# Patient Record
Sex: FEMALE | Race: WHITE | Employment: FULL TIME | URBAN - METROPOLITAN AREA
[De-identification: names, ages, dates, MRNs, and addresses within clinical notes are randomized per-mention and may not be internally consistent; named-entity substitution may affect disease eponyms.]

---

## 2017-02-13 ENCOUNTER — TELEPHONE (OUTPATIENT)
Dept: OBGYN CLINIC | Facility: CLINIC | Age: 42
End: 2017-02-13

## 2017-02-13 NOTE — TELEPHONE ENCOUNTER
Pt called stating her periods have become very irregular the last 3 months  Now they come every 22 or 23 days  Pt is questioning whether or not this is menapause

## 2017-02-13 NOTE — TELEPHONE ENCOUNTER
Pt c/o irregular menses, LMP 1/22/17 PLMP: 12/28 and 11/30. Menses x 3 days, changing tampon every 3-4 hours. Pt c/o more frequent night sweats and hot flashes. Pt s/p ablation 2010. Pt's mother went through menopause mid 42's.    Pt asking if any margo

## 2017-04-03 ENCOUNTER — OFFICE VISIT (OUTPATIENT)
Dept: OBGYN CLINIC | Facility: CLINIC | Age: 42
End: 2017-04-03

## 2017-04-03 VITALS
HEART RATE: 88 BPM | BODY MASS INDEX: 28.93 KG/M2 | DIASTOLIC BLOOD PRESSURE: 70 MMHG | HEIGHT: 66 IN | WEIGHT: 180 LBS | SYSTOLIC BLOOD PRESSURE: 128 MMHG

## 2017-04-03 DIAGNOSIS — R92.8 FOLLOW-UP EXAMINATION OF ABNORMAL MAMMOGRAM: ICD-10-CM

## 2017-04-03 DIAGNOSIS — Z12.4 ROUTINE PAPANICOLAOU SMEAR: ICD-10-CM

## 2017-04-03 DIAGNOSIS — Z01.419 ENCOUNTER FOR GYNECOLOGICAL EXAMINATION WITHOUT ABNORMAL FINDING: Primary | ICD-10-CM

## 2017-04-03 DIAGNOSIS — N92.6 IRREGULAR MENSES: ICD-10-CM

## 2017-04-03 PROCEDURE — 87624 HPV HI-RISK TYP POOLED RSLT: CPT | Performed by: OBSTETRICS & GYNECOLOGY

## 2017-04-03 PROCEDURE — 99396 PREV VISIT EST AGE 40-64: CPT | Performed by: OBSTETRICS & GYNECOLOGY

## 2017-04-03 NOTE — PROGRESS NOTES
Patient ID:   Yulisa Hutchins  is a 35year old female         HPI:     Here for general gyn exam. Last seen 2016. New medical/surgical hx:  None. No LMP recorded. Menses:   Hx Prior Abnormal Pap: No  Pap Date: 14  Pap Result Note Chronic rhinitis          Past Surgical History    TONSILLECTOMY      Comment AGE 15    OTHER SURGICAL HISTORY      Comment LEFT ELBOW - FX REPAIR    OTHER SURGICAL HISTORY      Comment LEFT SURGERY - ARTHROSCOPIC X2    OTHER SURGICAL HISTORY      Comment mammogram      Orders Placed This Encounter  ThinPrep PAP with HPV Reflex Request    Meds & Refills for this Visit:  No prescriptions requested or ordered in this encounter    Imaging & Consults:  [facility] FAITH 2D+3D DIAGNOSTIC GO CHARLES (CPT=77066/84315)    N

## 2017-04-25 ENCOUNTER — APPOINTMENT (OUTPATIENT)
Dept: LAB | Age: 42
End: 2017-04-25
Attending: NURSE PRACTITIONER
Payer: COMMERCIAL

## 2017-04-25 ENCOUNTER — OFFICE VISIT (OUTPATIENT)
Dept: OBGYN CLINIC | Facility: CLINIC | Age: 42
End: 2017-04-25

## 2017-04-25 VITALS
WEIGHT: 177 LBS | HEART RATE: 96 BPM | SYSTOLIC BLOOD PRESSURE: 132 MMHG | DIASTOLIC BLOOD PRESSURE: 76 MMHG | HEIGHT: 66 IN | BODY MASS INDEX: 28.45 KG/M2

## 2017-04-25 DIAGNOSIS — Z11.3 SCREEN FOR STD (SEXUALLY TRANSMITTED DISEASE): ICD-10-CM

## 2017-04-25 DIAGNOSIS — R35.0 URINARY FREQUENCY: ICD-10-CM

## 2017-04-25 DIAGNOSIS — Z11.3 SCREEN FOR STD (SEXUALLY TRANSMITTED DISEASE): Primary | ICD-10-CM

## 2017-04-25 PROCEDURE — 80074 ACUTE HEPATITIS PANEL: CPT | Performed by: NURSE PRACTITIONER

## 2017-04-25 PROCEDURE — 87389 HIV-1 AG W/HIV-1&-2 AB AG IA: CPT | Performed by: NURSE PRACTITIONER

## 2017-04-25 PROCEDURE — 86780 TREPONEMA PALLIDUM: CPT | Performed by: NURSE PRACTITIONER

## 2017-04-25 PROCEDURE — 99213 OFFICE O/P EST LOW 20 MIN: CPT | Performed by: NURSE PRACTITIONER

## 2017-04-25 PROCEDURE — 36415 COLL VENOUS BLD VENIPUNCTURE: CPT | Performed by: NURSE PRACTITIONER

## 2017-04-25 RX ORDER — NITROFURANTOIN 25; 75 MG/1; MG/1
100 CAPSULE ORAL 2 TIMES DAILY
Qty: 14 CAPSULE | Refills: 0 | Status: SHIPPED | OUTPATIENT
Start: 2017-04-25 | End: 2017-05-02

## 2017-04-25 RX ORDER — GARLIC EXTRACT 500 MG
1 CAPSULE ORAL DAILY
COMMUNITY
End: 2019-07-25

## 2017-04-25 NOTE — PROGRESS NOTES
S:  Patient here desiring STD screening. Patient desires any and all tests. She also notes recent urinary frequency.     O:  Vagina pink, moist without lesions  Cervix without lesions or CMT  Normal, clear discharge in vault  UA with blood otherwise unremar

## 2017-04-26 NOTE — PROGRESS NOTES
Quick Note:    Patient notified of normal labs.  Will call with results of cultures once they are complete.  ______

## 2017-04-28 NOTE — PROGRESS NOTES
Quick Note:    Patient notified of negative urine and GC/ CHL cultures. Patient still notes some urinary frequency. Advised increase water, less caffeine and acidic foods. Call with any worsening or persistent symptoms.   ______

## 2017-06-22 ENCOUNTER — HOSPITAL ENCOUNTER (OUTPATIENT)
Dept: MAMMOGRAPHY | Facility: HOSPITAL | Age: 42
Discharge: HOME | End: 2017-06-22
Attending: OBSTETRICS & GYNECOLOGY
Payer: COMMERCIAL

## 2017-06-22 ENCOUNTER — TELEPHONE (OUTPATIENT)
Dept: OBGYN CLINIC | Facility: CLINIC | Age: 42
End: 2017-06-22

## 2017-06-22 DIAGNOSIS — R92.8 FOLLOW-UP EXAMINATION OF ABNORMAL MAMMOGRAM: ICD-10-CM

## 2017-06-22 DIAGNOSIS — R92.8 BREAST LESION ON MAMMOGRAPHY: Primary | ICD-10-CM

## 2017-06-22 PROCEDURE — 77062 BREAST TOMOSYNTHESIS BI: CPT | Performed by: OBSTETRICS & GYNECOLOGY

## 2017-06-22 PROCEDURE — 77066 DX MAMMO INCL CAD BI: CPT | Performed by: OBSTETRICS & GYNECOLOGY

## 2017-06-22 PROCEDURE — 76642 ULTRASOUND BREAST LIMITED: CPT | Performed by: OBSTETRICS & GYNECOLOGY

## 2017-06-22 NOTE — TELEPHONE ENCOUNTER
Leobardo Reyna from Radiology dept.  Wants you to review the results of patient breast us and mammogram.  Results in Epic

## 2017-06-22 NOTE — PROGRESS NOTES
Quick Note:    New complex nodule in R breast. Ultrasound guided biopsy recommended.  Will generate request.  ______

## 2017-06-29 ENCOUNTER — HOSPITAL ENCOUNTER (OUTPATIENT)
Dept: MAMMOGRAPHY | Facility: HOSPITAL | Age: 42
Discharge: HOME | End: 2017-06-29
Attending: OBSTETRICS & GYNECOLOGY
Payer: COMMERCIAL

## 2017-06-29 DIAGNOSIS — R92.8 BREAST LESION ON MAMMOGRAPHY: ICD-10-CM

## 2017-06-29 PROCEDURE — 88305 TISSUE EXAM BY PATHOLOGIST: CPT | Performed by: OBSTETRICS & GYNECOLOGY

## 2017-06-29 PROCEDURE — 19083 BX BREAST 1ST LESION US IMAG: CPT | Performed by: OBSTETRICS & GYNECOLOGY

## 2017-06-29 PROCEDURE — 77065 DX MAMMO INCL CAD UNI: CPT | Performed by: OBSTETRICS & GYNECOLOGY

## 2017-06-29 NOTE — PROCEDURES
Caution: Report not yet finalized and possibly incomplete! PROCEDURE: ULTRASOUND GUIDED BIOPSY OF THE RIGHT BREAST, ONE SITE    COMPARISON: US ORCK BREAST RIGHT LIMITED ([facility] SAME DAY US)(CPT=76642), 10/26/2016, 8:54.   Rich Walker , [facility] FAITH 2D+3D DIAG MA breast.  2. Post procedure mammogram showed the clip to be deployed appropriately.

## 2017-07-01 NOTE — PROGRESS NOTES
Benign fibrocystic changes on biopsy. Voice mail to pt. Will need repeat right sided mammogram for follow up in 6 months.

## 2018-02-26 ENCOUNTER — OFFICE VISIT (OUTPATIENT)
Dept: OBGYN CLINIC | Facility: CLINIC | Age: 43
End: 2018-02-26

## 2018-02-26 VITALS
DIASTOLIC BLOOD PRESSURE: 70 MMHG | WEIGHT: 171 LBS | BODY MASS INDEX: 27.16 KG/M2 | SYSTOLIC BLOOD PRESSURE: 128 MMHG | HEIGHT: 66.5 IN

## 2018-02-26 DIAGNOSIS — N95.1 PERIMENOPAUSAL VASOMOTOR SYMPTOMS: Primary | ICD-10-CM

## 2018-02-26 DIAGNOSIS — Z98.890 H/O RIGHT BREAST BIOPSY: ICD-10-CM

## 2018-02-26 DIAGNOSIS — Z87.898 HISTORY OF ABNORMAL MAMMOGRAM: ICD-10-CM

## 2018-02-26 PROCEDURE — 99213 OFFICE O/P EST LOW 20 MIN: CPT | Performed by: OBSTETRICS & GYNECOLOGY

## 2018-02-26 RX ORDER — CHLORAL HYDRATE 500 MG
1000 CAPSULE ORAL DAILY
COMMUNITY
End: 2019-07-15

## 2018-02-26 NOTE — PROGRESS NOTES
Patient is a 38year old here for amado menopausal vasomotor symptoms. Last seen in April 2017 for annual exam. Menses still regular, Q 28 to 30 days x 2 to 3 light days since ablation.  Is getting flushing and diaphoresis with menses and for one week after

## 2018-08-15 ENCOUNTER — HOSPITAL ENCOUNTER (OUTPATIENT)
Dept: MAMMOGRAPHY | Facility: HOSPITAL | Age: 43
Discharge: HOME | End: 2018-08-15
Attending: OBSTETRICS & GYNECOLOGY
Payer: COMMERCIAL

## 2018-08-15 DIAGNOSIS — Z01.411 ENCOUNTER FOR GYNECOLOGICAL EXAMINATION WITH ABNORMAL FINDING: ICD-10-CM

## 2018-08-15 PROCEDURE — 76642 ULTRASOUND BREAST LIMITED: CPT | Performed by: OBSTETRICS & GYNECOLOGY

## 2018-08-15 PROCEDURE — 77062 BREAST TOMOSYNTHESIS BI: CPT | Performed by: OBSTETRICS & GYNECOLOGY

## 2018-08-15 PROCEDURE — 77066 DX MAMMO INCL CAD BI: CPT | Performed by: OBSTETRICS & GYNECOLOGY

## 2019-07-25 PROBLEM — M54.2 NECK PAIN, BILATERAL: Status: ACTIVE | Noted: 2019-07-25

## 2019-08-06 ENCOUNTER — OFFICE VISIT (OUTPATIENT)
Dept: UROLOGY | Facility: HOSPITAL | Age: 44
End: 2019-08-06
Attending: OBSTETRICS & GYNECOLOGY
Payer: COMMERCIAL

## 2019-08-06 VITALS
WEIGHT: 180 LBS | HEIGHT: 66 IN | DIASTOLIC BLOOD PRESSURE: 68 MMHG | BODY MASS INDEX: 28.93 KG/M2 | SYSTOLIC BLOOD PRESSURE: 128 MMHG | RESPIRATION RATE: 20 BRPM

## 2019-08-06 DIAGNOSIS — R31.9 HEMATURIA: ICD-10-CM

## 2019-08-06 DIAGNOSIS — D21.9 FIBROIDS: ICD-10-CM

## 2019-08-06 DIAGNOSIS — N39.3 FEMALE STRESS INCONTINENCE: Primary | ICD-10-CM

## 2019-08-06 DIAGNOSIS — N81.84 PELVIC MUSCLE WASTING: ICD-10-CM

## 2019-08-06 LAB
BILIRUB UR QL STRIP.AUTO: NEGATIVE
CLARITY UR REFRACT.AUTO: CLEAR
CONTROL RUN WITHIN 24 HOURS?: YES
GLUCOSE UR STRIP.AUTO-MCNC: NEGATIVE MG/DL
KETONES UR STRIP.AUTO-MCNC: NEGATIVE MG/DL
LEUKOCYTE ESTERASE UR QL STRIP.AUTO: NEGATIVE
LEUKOCYTE ESTERASE URINE: NEGATIVE
NITRITE UR QL STRIP.AUTO: NEGATIVE
NITRITE URINE: NEGATIVE
PH UR STRIP.AUTO: 7 [PH] (ref 4.5–8)
PROT UR STRIP.AUTO-MCNC: NEGATIVE MG/DL
SP GR UR STRIP.AUTO: 1.01 (ref 1–1.03)
UROBILINOGEN UR STRIP.AUTO-MCNC: <2 MG/DL

## 2019-08-06 PROCEDURE — 87086 URINE CULTURE/COLONY COUNT: CPT | Performed by: OBSTETRICS & GYNECOLOGY

## 2019-08-06 PROCEDURE — 99201 HC OUTPT EVAL AND MGNT NEW PT LEVEL 1: CPT

## 2019-08-06 PROCEDURE — 81001 URINALYSIS AUTO W/SCOPE: CPT | Performed by: OBSTETRICS & GYNECOLOGY

## 2019-08-06 NOTE — PROGRESS NOTES
Ramesh Snyder,   8/6/2019     Referred by Dr. Emanuel Escalante  Pt here with self    Patient presents with: Incontinence: intial visit. reffered by dr. Vadim Roach. pt. with 14cm uterus with multiple fibriods.      Planning surgery    HPI:  + KISHA  Denies UUI  Nocturi tobacco: Never Used    Alcohol use:  Yes      Alcohol/week: 8.0 standard drinks      Types: 8 Standard drinks or equivalent per week      Comment: OCCASIONALLY    Drug use: No       Allergies:    Bactrim                 RASH, SWELLING    Comment:THROAT SWEL effort  HEART:  RRR  ABDOMEN: soft, no mass, no hernia  EXTREM:  Normal, no edema  SKIN:  Normal, no lesions    PELVIC EXAM:  Ext.  Gen: no atrophy, no lesions  Urethra: no atrophy, nontender  Bladder:+fullness, nontender  Vagina: no atrophy  Cervix: no ble supplement  Stimulant:  MOM, Miralax    Treatment Plan, Surgical:   Hysterectomy-Vag, Abd, LAVH, Laporascopic Sacrocolpoxy Hysterectomy, Total Laporoscopic Hysterectomy, Robotic  Risks/benefits of salpingooophorectomy  Uterosacral suspension  Mid-urethral

## 2019-08-07 ENCOUNTER — OFFICE VISIT (OUTPATIENT)
Dept: UROLOGY | Facility: HOSPITAL | Age: 44
End: 2019-08-07
Attending: OBSTETRICS & GYNECOLOGY
Payer: COMMERCIAL

## 2019-08-07 VITALS
SYSTOLIC BLOOD PRESSURE: 122 MMHG | WEIGHT: 180 LBS | HEIGHT: 66 IN | BODY MASS INDEX: 28.93 KG/M2 | DIASTOLIC BLOOD PRESSURE: 78 MMHG

## 2019-08-07 DIAGNOSIS — N39.3 FEMALE STRESS INCONTINENCE: Primary | ICD-10-CM

## 2019-08-07 LAB
BLOOD URINE: NEGATIVE
CONTROL RUN WITHIN 24 HOURS?: YES
LEUKOCYTE ESTERASE URINE: NEGATIVE
NITRITE URINE: NEGATIVE

## 2019-08-07 PROCEDURE — 51784 ANAL/URINARY MUSCLE STUDY: CPT

## 2019-08-07 PROCEDURE — 51741 ELECTRO-UROFLOWMETRY FIRST: CPT

## 2019-08-07 PROCEDURE — 51797 INTRAABDOMINAL PRESSURE TEST: CPT

## 2019-08-07 PROCEDURE — 51729 CYSTOMETROGRAM W/VP&UP: CPT

## 2019-08-07 NOTE — PATIENT INSTRUCTIONS
[facility]  [address] [place] [address],[address]  [place], [address] [facility]  Office: [phone]      Urodynamic Testing Discharge Instructions: There are NO dietary or activity restrictions. You may resume your normal schedule.       You may hav

## 2019-08-07 NOTE — PROCEDURES
Patient here for urodynamic testing. Procedure explained and confirmed by patient. See evaluation form for results. Both verbal and written discharge instructions were given.   Patient tolerated procedure well and will follow up with Dr. Anne-Marie Umana sensation:   11 mL  First desire to void:   122 mL  Strong desire to void:  223 mL  Maximum cystometric capacity:   340 mL  Detrusor Activity:  []  Unstable   [x]  Stable  Urge leakage?     []  Yes [x]  No  Volume at 1st unhibited detrusor cont:   n/a mL

## 2019-08-09 ENCOUNTER — TELEPHONE (OUTPATIENT)
Dept: UROLOGY | Facility: HOSPITAL | Age: 44
End: 2019-08-09

## 2019-08-09 NOTE — TELEPHONE ENCOUNTER
Called patient to let her know urine culture results were negative,no infection. Instructed her to call with any questions or concerns. Left message.

## 2019-08-13 ENCOUNTER — OFFICE VISIT (OUTPATIENT)
Dept: UROLOGY | Facility: HOSPITAL | Age: 44
End: 2019-08-13
Attending: OBSTETRICS & GYNECOLOGY
Payer: COMMERCIAL

## 2019-08-13 VITALS
WEIGHT: 180 LBS | HEIGHT: 66 IN | SYSTOLIC BLOOD PRESSURE: 120 MMHG | BODY MASS INDEX: 28.93 KG/M2 | DIASTOLIC BLOOD PRESSURE: 60 MMHG

## 2019-08-13 DIAGNOSIS — R39.9 UTI SYMPTOMS: ICD-10-CM

## 2019-08-13 DIAGNOSIS — N39.3 FEMALE STRESS INCONTINENCE: Primary | ICD-10-CM

## 2019-08-13 LAB
CONTROL RUN WITHIN 24 HOURS?: YES
NITRITE URINE: NEGATIVE

## 2019-08-13 PROCEDURE — 87086 URINE CULTURE/COLONY COUNT: CPT | Performed by: OBSTETRICS & GYNECOLOGY

## 2019-08-13 PROCEDURE — 99211 OFF/OP EST MAY X REQ PHY/QHP: CPT

## 2019-08-13 RX ORDER — NITROFURANTOIN 25; 75 MG/1; MG/1
100 CAPSULE ORAL 2 TIMES DAILY
Qty: 14 CAPSULE | Refills: 0 | Status: SHIPPED | OUTPATIENT
Start: 2019-08-13 | End: 2019-08-20

## 2019-08-13 NOTE — PATIENT INSTRUCTIONS
[facility]     Post-Operative Guidelines      · AVOID CONSTIPATION - Take Miralax: one capful in water or juice each morning.  You can also take each evening if needed.  Use milk of magnesia daily as needed to avoid straining

## 2019-08-13 NOTE — PROGRESS NOTES
Pt presents w/ initial c/o KISHA  Urodynamic testing undergone without complication.   Results reviewed with patient  379/40cc & 510/20cc  No [facility] >340cc  KISHA @ 100cc    Discussed with patient mgmt options for KISHA    Thorough discussion of surgical risks,

## 2019-08-21 NOTE — PAT NURSING NOTE
Faxed alert that Heparin can cause similar reaction to Bactrim (rash, swelling) to Dr Frederick Rosario office Call to office as well.

## 2019-08-26 PROCEDURE — 88305 TISSUE EXAM BY PATHOLOGIST: CPT | Performed by: OBSTETRICS & GYNECOLOGY

## 2019-08-28 ENCOUNTER — LAB ENCOUNTER (OUTPATIENT)
Dept: LAB | Age: 44
End: 2019-08-28
Payer: COMMERCIAL

## 2019-08-28 DIAGNOSIS — N39.3 FEMALE STRESS INCONTINENCE: Primary | ICD-10-CM

## 2019-08-28 DIAGNOSIS — D21.9 PARASITIC FIBROID: ICD-10-CM

## 2019-08-28 LAB
DEPRECATED RDW RBC AUTO: 42.1 FL (ref 35.1–46.3)
ERYTHROCYTE [DISTWIDTH] IN BLOOD BY AUTOMATED COUNT: 12.6 % (ref 11–15)
HCT VFR BLD AUTO: 45.5 % (ref 35–48)
HGB BLD-MCNC: 14.9 G/DL (ref 12–16)
MCH RBC QN AUTO: 30 PG (ref 26–34)
MCHC RBC AUTO-ENTMCNC: 32.7 G/DL (ref 31–37)
MCV RBC AUTO: 91.5 FL (ref 80–100)
PLATELET # BLD AUTO: 277 10(3)UL (ref 150–450)
RBC # BLD AUTO: 4.97 X10(6)UL (ref 3.8–5.3)
WBC # BLD AUTO: 6.7 X10(3) UL (ref 4–11)

## 2019-08-28 PROCEDURE — 85027 COMPLETE CBC AUTOMATED: CPT

## 2019-09-12 ENCOUNTER — ANESTHESIA (OUTPATIENT)
Dept: SURGERY | Facility: HOSPITAL | Age: 44
DRG: 743 | End: 2019-09-12
Payer: COMMERCIAL

## 2019-09-12 ENCOUNTER — ANESTHESIA EVENT (OUTPATIENT)
Dept: SURGERY | Facility: HOSPITAL | Age: 44
DRG: 743 | End: 2019-09-12
Payer: COMMERCIAL

## 2019-09-12 ENCOUNTER — HOSPITAL ENCOUNTER (INPATIENT)
Facility: HOSPITAL | Age: 44
LOS: 2 days | Discharge: HOME | DRG: 743 | End: 2019-09-14
Attending: OBSTETRICS & GYNECOLOGY | Admitting: OBSTETRICS & GYNECOLOGY
Payer: COMMERCIAL

## 2019-09-12 DIAGNOSIS — N39.3 STRESS INCONTINENCE: Primary | ICD-10-CM

## 2019-09-12 DIAGNOSIS — D21.9 FIBROIDS: ICD-10-CM

## 2019-09-12 PROBLEM — D25.9 UTERINE FIBROID: Status: ACTIVE | Noted: 2019-09-12

## 2019-09-12 LAB
POCT LOT NUMBER: NORMAL
POCT URINE PREGNANCY: NEGATIVE

## 2019-09-12 PROCEDURE — 81025 URINE PREGNANCY TEST: CPT | Performed by: OBSTETRICS & GYNECOLOGY

## 2019-09-12 PROCEDURE — 0WJJ4ZZ INSPECTION OF PELVIC CAVITY, PERCUTANEOUS ENDOSCOPIC APPROACH: ICD-10-PCS | Performed by: OBSTETRICS & GYNECOLOGY

## 2019-09-12 PROCEDURE — 0UT90ZZ RESECTION OF UTERUS, OPEN APPROACH: ICD-10-PCS | Performed by: OBSTETRICS & GYNECOLOGY

## 2019-09-12 PROCEDURE — 88307 TISSUE EXAM BY PATHOLOGIST: CPT | Performed by: OBSTETRICS & GYNECOLOGY

## 2019-09-12 PROCEDURE — 0UT10ZZ RESECTION OF LEFT OVARY, OPEN APPROACH: ICD-10-PCS | Performed by: OBSTETRICS & GYNECOLOGY

## 2019-09-12 PROCEDURE — 0UT70ZZ RESECTION OF BILATERAL FALLOPIAN TUBES, OPEN APPROACH: ICD-10-PCS | Performed by: OBSTETRICS & GYNECOLOGY

## 2019-09-12 PROCEDURE — 0TJB8ZZ INSPECTION OF BLADDER, VIA NATURAL OR ARTIFICIAL OPENING ENDOSCOPIC: ICD-10-PCS | Performed by: OBSTETRICS & GYNECOLOGY

## 2019-09-12 RX ORDER — ONDANSETRON 4 MG/1
4 TABLET, FILM COATED ORAL EVERY 8 HOURS PRN
Status: DISCONTINUED | OUTPATIENT
Start: 2019-09-12 | End: 2019-09-14

## 2019-09-12 RX ORDER — ONDANSETRON 2 MG/ML
INJECTION INTRAMUSCULAR; INTRAVENOUS
Status: COMPLETED
Start: 2019-09-12 | End: 2019-09-12

## 2019-09-12 RX ORDER — NALOXONE HYDROCHLORIDE 0.4 MG/ML
80 INJECTION, SOLUTION INTRAMUSCULAR; INTRAVENOUS; SUBCUTANEOUS AS NEEDED
Status: DISCONTINUED | OUTPATIENT
Start: 2019-09-12 | End: 2019-09-12 | Stop reason: HOSPADM

## 2019-09-12 RX ORDER — DEXTROSE, SODIUM CHLORIDE, SODIUM LACTATE, POTASSIUM CHLORIDE, AND CALCIUM CHLORIDE 5; .6; .31; .03; .02 G/100ML; G/100ML; G/100ML; G/100ML; G/100ML
INJECTION, SOLUTION INTRAVENOUS CONTINUOUS
Status: DISCONTINUED | OUTPATIENT
Start: 2019-09-12 | End: 2019-09-13

## 2019-09-12 RX ORDER — ACETAMINOPHEN 500 MG
1000 TABLET ORAL ONCE
Status: DISCONTINUED | OUTPATIENT
Start: 2019-09-12 | End: 2019-09-12

## 2019-09-12 RX ORDER — HYDROMORPHONE HYDROCHLORIDE 1 MG/ML
0.4 INJECTION, SOLUTION INTRAMUSCULAR; INTRAVENOUS; SUBCUTANEOUS EVERY 5 MIN PRN
Status: DISCONTINUED | OUTPATIENT
Start: 2019-09-12 | End: 2019-09-12 | Stop reason: HOSPADM

## 2019-09-12 RX ORDER — ONDANSETRON 2 MG/ML
4 INJECTION INTRAMUSCULAR; INTRAVENOUS EVERY 8 HOURS PRN
Status: DISCONTINUED | OUTPATIENT
Start: 2019-09-12 | End: 2019-09-14

## 2019-09-12 RX ORDER — HEPARIN SODIUM 5000 [USP'U]/ML
5000 INJECTION, SOLUTION INTRAVENOUS; SUBCUTANEOUS ONCE
Status: COMPLETED | OUTPATIENT
Start: 2019-09-12 | End: 2019-09-12

## 2019-09-12 RX ORDER — NALBUPHINE HCL 10 MG/ML
2.5 AMPUL (ML) INJECTION EVERY 4 HOURS PRN
Status: DISCONTINUED | OUTPATIENT
Start: 2019-09-12 | End: 2019-09-13

## 2019-09-12 RX ORDER — ONDANSETRON 2 MG/ML
4 INJECTION INTRAMUSCULAR; INTRAVENOUS AS NEEDED
Status: DISCONTINUED | OUTPATIENT
Start: 2019-09-12 | End: 2019-09-12 | Stop reason: HOSPADM

## 2019-09-12 RX ORDER — SODIUM CHLORIDE, SODIUM LACTATE, POTASSIUM CHLORIDE, CALCIUM CHLORIDE 600; 310; 30; 20 MG/100ML; MG/100ML; MG/100ML; MG/100ML
INJECTION, SOLUTION INTRAVENOUS CONTINUOUS
Status: DISCONTINUED | OUTPATIENT
Start: 2019-09-12 | End: 2019-09-12 | Stop reason: HOSPADM

## 2019-09-12 RX ORDER — DIPHENHYDRAMINE HYDROCHLORIDE 50 MG/ML
12.5 INJECTION INTRAMUSCULAR; INTRAVENOUS EVERY 4 HOURS PRN
Status: DISCONTINUED | OUTPATIENT
Start: 2019-09-12 | End: 2019-09-14

## 2019-09-12 RX ORDER — NALOXONE HYDROCHLORIDE 0.4 MG/ML
0.08 INJECTION, SOLUTION INTRAMUSCULAR; INTRAVENOUS; SUBCUTANEOUS
Status: DISCONTINUED | OUTPATIENT
Start: 2019-09-12 | End: 2019-09-13

## 2019-09-12 RX ORDER — CLINDAMYCIN PHOSPHATE 900 MG/50ML
INJECTION INTRAVENOUS
Status: COMPLETED | OUTPATIENT
Start: 2019-09-12 | End: 2019-09-12

## 2019-09-12 RX ORDER — DIPHENHYDRAMINE HYDROCHLORIDE 50 MG/ML
12.5 INJECTION INTRAMUSCULAR; INTRAVENOUS AS NEEDED
Status: DISCONTINUED | OUTPATIENT
Start: 2019-09-12 | End: 2019-09-12 | Stop reason: HOSPADM

## 2019-09-12 RX ORDER — ONDANSETRON 2 MG/ML
4 INJECTION INTRAMUSCULAR; INTRAVENOUS EVERY 6 HOURS PRN
Status: DISCONTINUED | OUTPATIENT
Start: 2019-09-12 | End: 2019-09-14

## 2019-09-12 RX ORDER — BUPIVACAINE HYDROCHLORIDE AND EPINEPHRINE 2.5; 5 MG/ML; UG/ML
INJECTION, SOLUTION EPIDURAL; INFILTRATION; INTRACAUDAL; PERINEURAL AS NEEDED
Status: DISCONTINUED | OUTPATIENT
Start: 2019-09-12 | End: 2019-09-12 | Stop reason: HOSPADM

## 2019-09-12 RX ORDER — MEPERIDINE HYDROCHLORIDE 25 MG/ML
12.5 INJECTION INTRAMUSCULAR; INTRAVENOUS; SUBCUTANEOUS AS NEEDED
Status: DISCONTINUED | OUTPATIENT
Start: 2019-09-12 | End: 2019-09-12 | Stop reason: HOSPADM

## 2019-09-12 RX ORDER — KETOROLAC TROMETHAMINE 30 MG/ML
30 INJECTION, SOLUTION INTRAMUSCULAR; INTRAVENOUS EVERY 6 HOURS
Status: DISCONTINUED | OUTPATIENT
Start: 2019-09-12 | End: 2019-09-13

## 2019-09-12 RX ORDER — CLINDAMYCIN PHOSPHATE 900 MG/50ML
900 INJECTION INTRAVENOUS ONCE
Status: DISCONTINUED | OUTPATIENT
Start: 2019-09-12 | End: 2019-09-12 | Stop reason: HOSPADM

## 2019-09-12 NOTE — PROGRESS NOTES
[facility] Pharmacy Note:  Pain Consult    Licha Dickerson is a 53year old female started on Dilaudid PCA by Dr. Juanita Weiss. Pharmacy was consulted to review medication profile and to discontinue previously ordered narcotics and sedatives.     Medication profile w

## 2019-09-12 NOTE — ANESTHESIA PREPROCEDURE EVALUATION
PRE-OP EVALUATION    Patient Name: James Reilly    Pre-op Diagnosis: STRESS INCONTINENCE FIBROIDS    Procedure(s):  ROBOTIC ASSISTED TOTAL LAPAROSCOPIC HYSTERECTOMY WITH BILATERAL SALPINGECTOMY(JOLANTA)   POSSIBLE UTEROSACRAL VAGINAL VAULT SUSPENSION, PO Cardiovascular        Exercise tolerance: good     MET: >4                                           Endo/Other    Negative endo/other ROS. Pulmonary    Negative pulmonary ROS.                        Neuro/Psych        (+) anx general  NPO status verified and patient meets guidelines. Post-procedure pain management plan discussed with surgeon and patient.       Plan/risks discussed with: patient and spouse                Present on Admission:  **None**

## 2019-09-12 NOTE — H&P
52 y/o female with stress urinary incontinence and fibroid uterus. She has KISHA and was considering sling surgery, but has decided she does not want the sling surgery at this time.   Thorough discussion of surgical risks, benefits, and alternatives includin

## 2019-09-12 NOTE — H&P
10 42 [facility] H&P    [place] Patient Status:  Outpatient in a Bed    1975 MRN [id number]   Location [facility] Attending Kyle Oneal, [address] [facility] Day # 0 PCP Tay Pimentel MD     SUBJECTIVE:  Reason for Adm Comment:REDNESS  Penicillins             RASH    Comment:Has tolerated cephalosporin in the past     Past Medical History:   Diagnosis Date   • Anesthesia complication    • Anxiety state, unspecified     sporadic   • Chronic rhinitis Grandmother    • Heart Disorder Sister         MVP s/p ablation     Social History  Social History    Tobacco Use      Smoking status: Never Smoker      Smokeless tobacco: Never Used    Alcohol use:  Yes      Alcohol/week: 8.0 standard drinks      Types: 8

## 2019-09-12 NOTE — BRIEF OP NOTE
Pre-Operative Diagnosis: STRESS INCONTINENCE, FIBROIDS     Post-Operative Diagnosis: STRESS INCONTINENCE, UTERINE FIBROIDS GREATER THAN 250 GRAMS      Procedure Performed:   Procedure(s):  DIAGNOSTIC LAPAROSCOPY, OPEN ABDOMINAL HYSTERECTOMY, BILATERAL SALP

## 2019-09-12 NOTE — ANESTHESIA POSTPROCEDURE EVALUATION
[address] [facility] Patient Status:  Outpatient in a Bed   Age/Gender 53year old female MRN [id number]   [facility] SURGERY Attending Darwin Cherry, 160[facility] Day # 0 PCP Norvel Lefort, MD       Anesthesia Post-op Note    Proc

## 2019-09-12 NOTE — OPERATIVE REPORT
Pre-Operative Diagnosis: fibroid uterus  Post-Operative Diagnosis: Same    Procedure Performed:cystoscopy    Surgeon: Cosme Smith DO    Findings: Bilateral ureteral efflux, no evidence of bladder, ureteral, or urethral injury.  Hemostasis upon completi

## 2019-09-13 LAB
BASOPHILS # BLD AUTO: 0.01 X10(3) UL (ref 0–0.2)
BASOPHILS NFR BLD AUTO: 0.1 %
DEPRECATED RDW RBC AUTO: 39.4 FL (ref 35.1–46.3)
EOSINOPHIL # BLD AUTO: 0 X10(3) UL (ref 0–0.7)
EOSINOPHIL NFR BLD AUTO: 0 %
ERYTHROCYTE [DISTWIDTH] IN BLOOD BY AUTOMATED COUNT: 12.1 % (ref 11–15)
HCT VFR BLD AUTO: 35.1 % (ref 35–48)
HGB BLD-MCNC: 11.9 G/DL (ref 12–16)
IMM GRANULOCYTES # BLD AUTO: 0.06 X10(3) UL (ref 0–1)
IMM GRANULOCYTES NFR BLD: 0.4 %
LYMPHOCYTES # BLD AUTO: 0.61 X10(3) UL (ref 1–4)
LYMPHOCYTES NFR BLD AUTO: 3.7 %
MCH RBC QN AUTO: 30.3 PG (ref 26–34)
MCHC RBC AUTO-ENTMCNC: 33.9 G/DL (ref 31–37)
MCV RBC AUTO: 89.3 FL (ref 80–100)
MONOCYTES # BLD AUTO: 1.7 X10(3) UL (ref 0.1–1)
MONOCYTES NFR BLD AUTO: 10.3 %
NEUTROPHILS # BLD AUTO: 14.19 X10 (3) UL (ref 1.5–7.7)
NEUTROPHILS # BLD AUTO: 14.19 X10(3) UL (ref 1.5–7.7)
NEUTROPHILS NFR BLD AUTO: 85.5 %
PLATELET # BLD AUTO: 260 10(3)UL (ref 150–450)
RBC # BLD AUTO: 3.93 X10(6)UL (ref 3.8–5.3)
WBC # BLD AUTO: 16.6 X10(3) UL (ref 4–11)

## 2019-09-13 PROCEDURE — 85025 COMPLETE CBC W/AUTO DIFF WBC: CPT | Performed by: OBSTETRICS & GYNECOLOGY

## 2019-09-13 RX ORDER — IBUPROFEN 600 MG/1
600 TABLET ORAL EVERY 6 HOURS PRN
Status: DISCONTINUED | OUTPATIENT
Start: 2019-09-13 | End: 2019-09-14

## 2019-09-13 RX ORDER — HYDROCODONE BITARTRATE AND ACETAMINOPHEN 5; 325 MG/1; MG/1
1 TABLET ORAL EVERY 6 HOURS PRN
Status: DISCONTINUED | OUTPATIENT
Start: 2019-09-13 | End: 2019-09-14

## 2019-09-13 RX ORDER — IBUPROFEN 400 MG/1
400 TABLET ORAL EVERY 6 HOURS PRN
Status: DISCONTINUED | OUTPATIENT
Start: 2019-09-13 | End: 2019-09-14

## 2019-09-13 RX ORDER — ACETAMINOPHEN 500 MG
500 TABLET ORAL EVERY 6 HOURS PRN
Status: DISCONTINUED | OUTPATIENT
Start: 2019-09-13 | End: 2019-09-14

## 2019-09-13 RX ORDER — ACETAMINOPHEN 500 MG
1000 TABLET ORAL EVERY 6 HOURS PRN
Status: DISCONTINUED | OUTPATIENT
Start: 2019-09-13 | End: 2019-09-14

## 2019-09-13 RX ORDER — KETOROLAC TROMETHAMINE 30 MG/ML
30 INJECTION, SOLUTION INTRAMUSCULAR; INTRAVENOUS EVERY 6 HOURS
Status: DISCONTINUED | OUTPATIENT
Start: 2019-09-13 | End: 2019-09-13

## 2019-09-13 NOTE — PLAN OF CARE
Patient AOX4. PCA in place. ABD dressing w/scant drainage noted. Peripad with scant drainage noted. POC discussed, all questions and concerns addressed. All safety measures in place. Will continue to monitor.

## 2019-09-13 NOTE — PLAN OF CARE
Problem: Patient/Family Goals  Goal: Patient/Family Long Term Goal  Description  Patient's Long Term Goal: Discharge    Interventions:  - advance diet  - ambulation  - See additional Care Plan goals for specific interventions   Outcome: Progressing  Goal vomiting  Description  INTERVENTIONS:  - Maintain adequate hydration with IV or PO as ordered and tolerated  - Nasogastric tube to low intermittent suction as ordered  - Evaluate effectiveness of ordered antiemetic medications  - Provide nonpharmacologic c tissue  - Implement wound care per orders  - Initiate isolation precautions as appropriate  - Initiate Pressure Ulcer prevention bundle as indicated  Outcome: Progressing     Problem: HEMATOLOGIC - ADULT  Goal: Maintains hematologic stability  Description

## 2019-09-13 NOTE — PROGRESS NOTES
[facility] Patient Status:  Inpatient    1975 MRN [id number]   [facility] 3NW-A Attending Glendy Higuera MD   Hosp Day # 1 PCP Neto Alfaro MD       SUBJECTIVE:  Bonita Rankin is a 53year old female s/p TA

## 2019-09-14 VITALS
HEART RATE: 86 BPM | OXYGEN SATURATION: 100 % | WEIGHT: 175.69 LBS | RESPIRATION RATE: 16 BRPM | DIASTOLIC BLOOD PRESSURE: 81 MMHG | SYSTOLIC BLOOD PRESSURE: 135 MMHG | BODY MASS INDEX: 27.57 KG/M2 | TEMPERATURE: 99 F | HEIGHT: 67 IN

## 2019-09-14 LAB
BASOPHILS # BLD AUTO: 0.03 X10(3) UL (ref 0–0.2)
BASOPHILS NFR BLD AUTO: 0.3 %
DEPRECATED RDW RBC AUTO: 40.4 FL (ref 35.1–46.3)
EOSINOPHIL # BLD AUTO: 0.04 X10(3) UL (ref 0–0.7)
EOSINOPHIL NFR BLD AUTO: 0.4 %
ERYTHROCYTE [DISTWIDTH] IN BLOOD BY AUTOMATED COUNT: 12.4 % (ref 11–15)
HCT VFR BLD AUTO: 33.3 % (ref 35–48)
HGB BLD-MCNC: 11.1 G/DL (ref 12–16)
IMM GRANULOCYTES # BLD AUTO: 0.04 X10(3) UL (ref 0–1)
IMM GRANULOCYTES NFR BLD: 0.4 %
LYMPHOCYTES # BLD AUTO: 0.88 X10(3) UL (ref 1–4)
LYMPHOCYTES NFR BLD AUTO: 9 %
MCH RBC QN AUTO: 29.8 PG (ref 26–34)
MCHC RBC AUTO-ENTMCNC: 33.3 G/DL (ref 31–37)
MCV RBC AUTO: 89.3 FL (ref 80–100)
MONOCYTES # BLD AUTO: 1.06 X10(3) UL (ref 0.1–1)
MONOCYTES NFR BLD AUTO: 10.8 %
NEUTROPHILS # BLD AUTO: 7.78 X10 (3) UL (ref 1.5–7.7)
NEUTROPHILS # BLD AUTO: 7.78 X10(3) UL (ref 1.5–7.7)
NEUTROPHILS NFR BLD AUTO: 79.1 %
PLATELET # BLD AUTO: 208 10(3)UL (ref 150–450)
RBC # BLD AUTO: 3.73 X10(6)UL (ref 3.8–5.3)
WBC # BLD AUTO: 9.8 X10(3) UL (ref 4–11)

## 2019-09-14 PROCEDURE — 85025 COMPLETE CBC W/AUTO DIFF WBC: CPT | Performed by: OBSTETRICS & GYNECOLOGY

## 2019-09-14 NOTE — PROGRESS NOTES
Patient discharged home at this time. All discharge instructions were reviewed with the patient and patient's  at the bedside. All questions and concerns were addressed. IV access removed.  Patient left unit via wheelchair, accompanied by transport s

## 2019-09-14 NOTE — PROGRESS NOTES
POD#2 s/p VALERIY  No complaints. Tolerating GD. +fatus. No VB, voiding frely.   /81 (BP Location: Right arm)   Pulse 86   Temp 98.6 °F (37 °C) (Oral)   Resp 16   Ht 5' 7\" (1.702 m)   Wt 175 lb 11.3 oz (79.7 kg)   LMP 08/15/2019   SpO2 100%   BMI 28.36

## 2019-09-14 NOTE — PLAN OF CARE
Patient alert and oriented x4. Denies need for pain medication. Room air. Denies difficulties breathing or cardiac symptoms. Voiding. Tolerating diet. Passing gas. Denies nausea. Low transverse incision with steri strips remains c/d/i.  Lap sites x3 with st limitations  - Instruct pt to call for assistance with activity based on assessment  - Modify environment to reduce risk of injury  - Provide assistive devices as appropriate  - Consider OT/PT consult to assist with strengthening/mobility  - Encourage toil skin care algorithm/standards of care as needed  Outcome: Progressing  Goal: Incision(s), wounds(s) or drain site(s) healing without S/S of infection  Description  INTERVENTIONS:  - Assess and document risk factors for pressure ulcer development  - Assess

## 2019-09-16 NOTE — PAYOR COMM NOTE
--------------  DISCHARGE REVIEW    Payor: Francheska Lea #:  [id number]  Authorization Number: [id number] / [id number]    Admit date: 9/12/19  Admit time:  1813  Discharge Date: 9/14/2019  9:45 AM     Admitting Physician: Kishor Clemente,

## 2019-09-16 NOTE — PAYOR COMM NOTE
--------------  CONTINUED STAY REVIEW    Payor: Francheska Lea #:  [id number]  Authorization Number: [id number] / [id number]    Admit date: 9/12/19  Admit time: [address]    Admitting Physician: Rose White MD  Attending Physici Start: 09/12/19 1300 End: 09/12/19 1652    Order specific questions:   What infection is this being used to treat?  Surgical prophylaxis        And  Gentamicin Sulfate (GARAMYCIN) 340 mg in sodium chloride 0.9% 100 mL IVPB   Dose: 5 mg/kg  Weight Dosing Inf Rate: 100 mL/hr Freq: Continuous Route: IV  Last Dose: Stopped (09/12/19 1735)  Start: 09/12/19 1345 End: 09/12/19 1813        lactated ringers infusion   Rate: 20 mL/hr Freq: Continuous Route: IV  Start: 09/12/19 1215 End: 09/12/19 1841        Medications Freq: Intra-op continuous PRN  Start: 09/12/19 1410 End: 09/12/19 1410        HYDROcodone-acetaminophen (NORCO) 5-325 MG per tab 1 tablet   Dose: 1 tablet  Freq: Every 6 hours PRN Route: OR  PRN Reason: moderate pain  Start: 09/13/19 1243 End: 09/14/19 123

## 2020-03-31 NOTE — OPERATIVE REPORT
[address]    PATIENT'S NAME: Malcolm Gonzalez   ATTENDING PHYSICIAN: Naye Alexander M.D. OPERATING PHYSICIAN: Naye Alexander M.D.    PATIENT ACCOUNT#:   [de-identified]    LOCATION:  [address] [place] [address]  MEDICAL RECORD #:   [id number]       DATE OF BIRTH: [birth year]. At this point, it was identified that a small portion of the cecum had air in the mucosal portion of the bowel. Dr. Temo Obrien from General Surgery was consulted intraoperatively.   Upon presentation, he evaluated the cecum and no bowel injury was n Pathology. Continued bleeding was noted from the infundibulopelvic ligament and the ligament was very elongated from the stretch that had been put on from the size of the uterus.   The decision was made to proceed with an oophorectomy given the significant of the abdomen and pelvis was done. There was oozing noted from the right side by the uterine vessels. A right angle was used to grasp the area of bleeding on the serosal surface and 2 sutures of 0 Vicryl were placed.   Hemostasis was achieved; however, t 35 35 35 35 35 35 35 35 35 35 35

## 2021-11-19 NOTE — PAYOR COMM NOTE
--------------  ADMISSION REVIEW     Payor: Francheska Lea #:  [id number]  Authorization Number: [id number] / [id number]    Admit date: 9/12/19  Admit time: [address]       Admitting Physician: Neri Villaseñor MD  Attending Physicia Omega-3 Fatty Acids (EQL OMEGA 3 FISH OIL OR) Take 1 capsule by mouth 2 (two) times daily. Disp:  Rfl:  8/21/2019   NASAL SALINE NA 1 spray by Nasal route as needed.    Disp:  Rfl:  More than a month at Unknown time       Bactrim                 RASH, SWE Problem Relation Age of Onset   • Lipids Father    • Hypertension Father    • Heart Disease Father    • High Cholesterol Father    • Breast Cancer Mother 62   • Hypertension Mother    • Cancer Maternal Grandmother         THROAT - SMOKER   • Anemia Son After consent signed and all questions answered the patient was taken to the operating room with IV running,  Anesthesia was initiated. She was placed in the dorsal lithotomy position and prepped and draped in the usual sterile fashion.     Dr Ольга Tobin (1300)-Not Given   1652-D/C'd             And  Gentamicin Sulfate (GARAMYCIN) 340 mg in sodium chloride 0.9% 100 mL IVPB   Dose: 5 mg/kg  Weight Dosing Info: 69.6 kg (Adjusted)  Freq:  Once Route: IV  Start: 09/12/19 1300 End: 09/12/19 1652    Order specif 0723-Handoff   1244-D/C'd            lactated ringers infusion   Rate: 100 mL/hr Freq: Continuous Route: IV  Last Dose: Stopped (09/12/19 1735)  Start: 09/12/19 1345 End: 09/12/19 1813 1648-Started Other   1735-Stopped   1813-D/C'd           lactated r Start: 09/12/19 1330 End: 09/12/19 1813    1655-Given   1700-Given   1813-D/C'd           Gentamicin Sulfate (GARAMYCIN) 340 mg in sodium chloride 0.9% 100 mL IVPB   Freq:  Intra-op continuous PRN  Start: 09/12/19 1410 End: 09/12/19 1410    1410-New Bag     Freq: Every 8 hours PRN Route: IV  PRN Reasons: Nausea,vomiting  Start: 09/12/19 1841        Or  Ondansetron HCl (ZOFRAN) tab 4 mg   Dose: 4 mg  Freq: Every 8 hours PRN Route: OR  PRN Reasons: Nausea,vomiting  Start: 09/12/19 1841        ondansetron HCl (Z Detail Level: Zone

## 2022-09-01 ENCOUNTER — HOSPITAL ENCOUNTER (OUTPATIENT)
Dept: MAMMOGRAPHY | Facility: HOSPITAL | Age: 47
Discharge: HOME | End: 2022-09-01
Attending: NURSE PRACTITIONER
Payer: COMMERCIAL

## 2022-09-01 DIAGNOSIS — Z12.31 VISIT FOR SCREENING MAMMOGRAM: ICD-10-CM

## 2022-09-01 PROCEDURE — 77063 BREAST TOMOSYNTHESIS BI: CPT | Performed by: NURSE PRACTITIONER

## 2022-09-01 PROCEDURE — 77067 SCR MAMMO BI INCL CAD: CPT | Performed by: NURSE PRACTITIONER

## (undated) DEVICE — Device

## (undated) DEVICE — SOL H2O 1000ML BTL

## (undated) DEVICE — STANDARD HYPODERMIC NEEDLE,POLYPROPYLENE HUB: Brand: MONOJECT

## (undated) DEVICE — INSUFFLATION NEEDLE TO ESTABLISH PNEUMOPERITONEUM.: Brand: INSUFFLATION NEEDLE

## (undated) DEVICE — REM POLYHESIVE ADULT PATIENT RETURN ELECTRODE: Brand: VALLEYLAB

## (undated) DEVICE — GYN LAP/ROBOTIC: Brand: MEDLINE INDUSTRIES, INC.

## (undated) DEVICE — 40580 - THE PINK PAD - ADVANCED TRENDELENBURG POSITIONING KIT: Brand: 40580 - THE PINK PAD - ADVANCED TRENDELENBURG POSITIONING KIT

## (undated) DEVICE — SPONGE RAYTEC 4X4 RF DETECT

## (undated) DEVICE — GAMMEX® PI HYBRID SIZE 6.5, STERILE POWDER-FREE SURGICAL GLOVE, POLYISOPRENE AND NEOPRENE BLEND: Brand: GAMMEX

## (undated) DEVICE — LIGASURE IMPACT OPEN DEVICE

## (undated) DEVICE — LAPAROTOMY SPONGE - RF AND X-RAY DETECTABLE PRE-WASHED: Brand: SITUATE

## (undated) DEVICE — SUTURE VICRYL 0

## (undated) DEVICE — NON-ADHERENT PAD PREPACK: Brand: TELFA

## (undated) DEVICE — CAUTERY PENCIL

## (undated) DEVICE — DV KIT ACCESSORY 4-ARM

## (undated) DEVICE — SOL  .9 1000ML BTL

## (undated) DEVICE — MEDI-VAC NON-CONDUCTIVE SUCTION TUBING: Brand: CARDINAL HEALTH

## (undated) DEVICE — BULB SYRINGE,IRRIGATION WITH PROTECTIVE CAP: Brand: DOVER

## (undated) DEVICE — VIOLET BRAIDED (POLYGLACTIN 910), SYNTHETIC ABSORBABLE SUTURE: Brand: COATED VICRYL

## (undated) DEVICE — SUTURE MONOCRYL 4-0 PS-2

## (undated) DEVICE — GAMMEX® NON-LATEX PI TEXTURED SIZE 6, STERILE POLYISOPRENE POWDER-FREE SURGICAL GLOVE: Brand: GAMMEX

## (undated) DEVICE — ELECTRO LUBE IS A SINGLE PATIENT USE DEVICE THAT IS INTENDED TO BE USED ON ELECTROSURGICAL ELECTRODES TO REDUCE STICKING.: Brand: KEY SURGICAL ELECTRO LUBE

## (undated) DEVICE — SUTURE VICRYL 0 CT-1

## (undated) DEVICE — [HIGH FLOW INSUFFLATOR,  DO NOT USE IF PACKAGE IS DAMAGED,  KEEP DRY,  KEEP AWAY FROM SUNLIGHT,  PROTECT FROM HEAT AND RADIOACTIVE SOURCES.]: Brand: PNEUMOSURE

## (undated) DEVICE — SOL H2O IV

## (undated) DEVICE — TROCAR: Brand: KII FIOS FIRST ENTRY

## (undated) DEVICE — TROCAR: Brand: KII® SLEEVE

## (undated) DEVICE — GAMMEX® PI HYBRID SIZE 6, STERILE POWDER-FREE SURGICAL GLOVE, POLYISOPRENE AND NEOPRENE BLEND: Brand: GAMMEX

## (undated) DEVICE — #15 STERILE STAINLESS BLADE: Brand: STERILE STAINLESS BLADES

## (undated) DEVICE — GYN CDS: Brand: MEDLINE INDUSTRIES, INC.

## (undated) DEVICE — COVER,MAYO STAND,STERILE: Brand: MEDLINE

## (undated) DEVICE — TUBING CYSTO

## (undated) DEVICE — 2, DISPOSABLE SUCTION/IRRIGATOR WITHOUT DISPOSABLE TIP: Brand: STRYKEFLOW

## (undated) DEVICE — ARISTA 1 GRAM

## (undated) DEVICE — KENDALL SCD EXPRESS SLEEVES, KNEE LENGTH, MEDIUM: Brand: KENDALL SCD

## (undated) DEVICE — 3M(TM) TEGADERM(TM) TRANSPARENT FILM DRESSING FRAME STYLE 9505W: Brand: 3M™ TEGADERM™

## (undated) DEVICE — RETRACTOR LONE STAR STAYS LG

## (undated) NOTE — LETTER
[facility]  Phone: [phone]  Right Fax: [phone]  [address] [place] [address] By:  Stephanie Velez RN  Date: 8/21/19    Patient Name: Colin Barraza  Surgery Date: 9/12/2019    CSN: [id number]  Medical Record: [facility] Rev 2/12/14

## (undated) NOTE — LETTER
[address]  [address]  [place] [address]  [facility]: [phone]  FAX: [phone]   Consent to Procedure/Sedation    Date: __8/7/2019_____    Time: ___10:54 AM ___    1.  I authorize the performance ___________________________    Signature of person authorized to consent for patient: Relationship to patient:  ___________________________    ___________________    Witness: ____________________     Date: ______________    Printed: 8/7/2019   10:54 AM

## (undated) NOTE — MR AVS SNAPSHOT
[facility] [address], [facility] [address] [facility]  [phone]               Thank you for choosing us for your health care visit with Sanna Castro MD.  We are glad to serve you and happy to provide you with t Today's Vital Signs     BP Pulse Height Weight BMI    128/70 mmHg 88 66\" 180 lb 29.07 kg/m2         Current Medications          This list is accurate as of: 4/3/17  4:34 PM.  Always use your most recent med list.                DAILY VALUE MULTIVITAMIN O

## (undated) NOTE — MR AVS SNAPSHOT
[facility]  [address] [facility] [address] [address] [phone]               Thank you for choosing us for your health care visit with HASMUKH Weber.   We are glad to serve you and happy to provide you with this sum 132/76 mmHg 96 66\" 177 lb 28.58 kg/m2         Current Medications          This list is accurate as of: 4/25/17 10:11 AM.  Always use your most recent med list.                Acidophilus/Pectin Caps   Take 1 capsule by mouth daily.    Commonly known as:

## (undated) NOTE — MR AVS SNAPSHOT
After Visit Summary   4/3/2017    Sophie Hong    MRN: [id number]           Visit Information        Provider Department Dept Phone    4/3/2017 12:00 PM Jagjit Lowery MD [facility] [phone]      Your Vitals Were     BP Pulse Ht Wt BMI Imaging Scheduling Instructions     Monday April 03, 2017     Imaging:  [facility] FAITH 2D+3D DIAGNOSTIC [facility]  BILAT (EBJ=[id number])    Instructions:   To schedule an appointment for your radiology test please call [facility] Scheduling at [phone]

## (undated) NOTE — Clinical Note
Jewel Hi - I saw Jameel Pa today with fiboids & KISHA. I've recommended bladder testing. I will work to coordinate surgery. I appreciate the opportunity to participate in her care.  Thanks, Jcarlos Blair